# Patient Record
Sex: MALE | Race: ASIAN | NOT HISPANIC OR LATINO | Employment: UNEMPLOYED | URBAN - METROPOLITAN AREA
[De-identification: names, ages, dates, MRNs, and addresses within clinical notes are randomized per-mention and may not be internally consistent; named-entity substitution may affect disease eponyms.]

---
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
## 2025-01-22 ENCOUNTER — OFFICE VISIT (OUTPATIENT)
Dept: FAMILY MEDICINE | Facility: CLINIC | Age: 20
End: 2025-01-22
Payer: COMMERCIAL

## 2025-01-22 VITALS
TEMPERATURE: 98.5 F | HEIGHT: 66 IN | BODY MASS INDEX: 26.13 KG/M2 | SYSTOLIC BLOOD PRESSURE: 114 MMHG | DIASTOLIC BLOOD PRESSURE: 77 MMHG | HEART RATE: 78 BPM | OXYGEN SATURATION: 99 % | WEIGHT: 162.6 LBS | RESPIRATION RATE: 17 BRPM

## 2025-01-22 DIAGNOSIS — Z11.59 NEED FOR HEPATITIS C SCREENING TEST: ICD-10-CM

## 2025-01-22 DIAGNOSIS — Z00.129 ENCOUNTER FOR ROUTINE CHILD HEALTH EXAMINATION W/O ABNORMAL FINDINGS: Primary | ICD-10-CM

## 2025-01-22 DIAGNOSIS — Z11.4 SCREENING FOR HIV (HUMAN IMMUNODEFICIENCY VIRUS): ICD-10-CM

## 2025-01-22 DIAGNOSIS — Z13.220 LIPID SCREENING: ICD-10-CM

## 2025-01-22 DIAGNOSIS — Z13.1 ENCOUNTER FOR SCREENING EXAMINATION FOR IMPAIRED GLUCOSE REGULATION AND DIABETES MELLITUS: ICD-10-CM

## 2025-01-22 DIAGNOSIS — Z23 ENCOUNTER FOR IMMUNIZATION: ICD-10-CM

## 2025-01-22 LAB
CHOLEST SERPL-MCNC: 218 MG/DL
FASTING STATUS PATIENT QL REPORTED: NO
FASTING STATUS PATIENT QL REPORTED: NO
GLUCOSE SERPL-MCNC: 99 MG/DL (ref 70–99)
HCV AB SERPL QL IA: NONREACTIVE
HDLC SERPL-MCNC: 39 MG/DL
HIV 1+2 AB+HIV1 P24 AG SERPL QL IA: NONREACTIVE
LDLC SERPL CALC-MCNC: 143 MG/DL
NONHDLC SERPL-MCNC: 179 MG/DL
TRIGL SERPL-MCNC: 181 MG/DL

## 2025-01-22 PROCEDURE — 36415 COLL VENOUS BLD VENIPUNCTURE: CPT | Performed by: PREVENTIVE MEDICINE

## 2025-01-22 PROCEDURE — 90480 ADMN SARSCOV2 VAC 1/ONLY CMP: CPT | Mod: SL | Performed by: PREVENTIVE MEDICINE

## 2025-01-22 PROCEDURE — 92551 PURE TONE HEARING TEST AIR: CPT | Performed by: PREVENTIVE MEDICINE

## 2025-01-22 PROCEDURE — 90471 IMMUNIZATION ADMIN: CPT | Mod: SL | Performed by: PREVENTIVE MEDICINE

## 2025-01-22 PROCEDURE — 82947 ASSAY GLUCOSE BLOOD QUANT: CPT | Performed by: PREVENTIVE MEDICINE

## 2025-01-22 PROCEDURE — 87389 HIV-1 AG W/HIV-1&-2 AB AG IA: CPT | Performed by: PREVENTIVE MEDICINE

## 2025-01-22 PROCEDURE — 86803 HEPATITIS C AB TEST: CPT | Performed by: PREVENTIVE MEDICINE

## 2025-01-22 PROCEDURE — 96127 BRIEF EMOTIONAL/BEHAV ASSMT: CPT | Performed by: PREVENTIVE MEDICINE

## 2025-01-22 PROCEDURE — 99173 VISUAL ACUITY SCREEN: CPT | Mod: 59 | Performed by: PREVENTIVE MEDICINE

## 2025-01-22 PROCEDURE — 91320 SARSCV2 VAC 30MCG TRS-SUC IM: CPT | Mod: SL | Performed by: PREVENTIVE MEDICINE

## 2025-01-22 PROCEDURE — 99395 PREV VISIT EST AGE 18-39: CPT | Mod: 25 | Performed by: PREVENTIVE MEDICINE

## 2025-01-22 PROCEDURE — S0302 COMPLETED EPSDT: HCPCS | Performed by: PREVENTIVE MEDICINE

## 2025-01-22 PROCEDURE — 80061 LIPID PANEL: CPT | Performed by: PREVENTIVE MEDICINE

## 2025-01-22 PROCEDURE — 90656 IIV3 VACC NO PRSV 0.5 ML IM: CPT | Mod: SL | Performed by: PREVENTIVE MEDICINE

## 2025-01-22 SDOH — HEALTH STABILITY: PHYSICAL HEALTH: ON AVERAGE, HOW MANY MINUTES DO YOU ENGAGE IN EXERCISE AT THIS LEVEL?: PATIENT DECLINED

## 2025-01-22 SDOH — HEALTH STABILITY: PHYSICAL HEALTH
ON AVERAGE, HOW MANY DAYS PER WEEK DO YOU ENGAGE IN MODERATE TO STRENUOUS EXERCISE (LIKE A BRISK WALK)?: PATIENT DECLINED

## 2025-01-22 ASSESSMENT — PAIN SCALES - GENERAL: PAINLEVEL_OUTOF10: NO PAIN (0)

## 2025-01-22 NOTE — LETTER
January 23, 2025      Prateek Crystal  [address]  [place] [address]        Dear ,    We are writing to inform you of your test results.    Your LDL is: Lab Results        Component                Value               Date                       LDL                      143                 01/22/2025          Your LDL goal is to be less than 160   Your HDL is: Lab Results        Component                Value               Date                       HDL                      39                  01/22/2025           Goal HDL is Greater than 40 (for men) or 50 (for women).   Your Triglycerides are: Lab Results        Component                Value               Date                       TRIG                     181                 01/22/2025          Goal TRIGLYCERIDES are less than 150.       Here are some ways to improve your cholesterol without medication:     Try to get at least 45 minutes of aerobic exercise 5-6 days a week   Maintain a healthy body weight   Eat less saturated fats   Buy lean cuts of meat, reduce your portions of red meat or substitute poultry or fish   Avoid fried or fast foods that are high in fat   Eat more fruits and vegetables     Screening test for HIV and hepatitis C are negative.  Glucose is normal, you do not have diabetes.    Resulted Orders   Lipid panel reflex to direct LDL Non-fasting   Result Value Ref Range    Cholesterol 218 (H) <200 mg/dL    Triglycerides 181 (H) <150 mg/dL    Direct Measure HDL 39 (L) >=40 mg/dL    LDL Cholesterol Calculated 143 (H) <100 mg/dL    Non HDL Cholesterol 179 (H) <130 mg/dL    Patient Fasting > 8hrs? No     Narrative    Cholesterol  Desirable: < 200 mg/dL  Borderline High: 200 - 239 mg/dL  High: >= 240 mg/dL    Triglycerides  Normal: < 150 mg/dL  Borderline High: 150 - 199 mg/dL  High: 200-499 mg/dL  Very High: >= 500 mg/dL    Direct Measure HDL  Female: >= 50 mg/dL   Male: >= 40 mg/dL    LDL Cholesterol  Desirable: < 100 mg/dL  Above  Desirable: 100 - 129 mg/dL   Borderline High: 130 - 159 mg/dL   High:  160 - 189 mg/dL   Very High: >= 190 mg/dL    Non HDL Cholesterol  Desirable: < 130 mg/dL  Above Desirable: 130 - 159 mg/dL  Borderline High: 160 - 189 mg/dL  High: 190 - 219 mg/dL  Very High: >= 220 mg/dL   HIV Antigen Antibody Combo   Result Value Ref Range    HIV Antigen Antibody Combo Nonreactive Nonreactive      Comment:      Negative HIV-1 p24 antigen and HIV-1/2 antibody screening test results usually indicate the absence of HIV-1 and HIV-2 infection. However, such negative results do not rule-out acute HIV infection.  If acute HIV-1 or HIV-2 infection is suspected, detection of HIV-1 or HIV-2 RNA  is recommended. This result is obtained using the Roche Elecsys HIV Duo method on the noreen e801 immunoassay analyzer.   Hepatitis C Screen Reflex to HCV RNA Quant and Genotype   Result Value Ref Range    Hepatitis C Antibody Nonreactive Nonreactive      Comment:      A nonreactive screening test result does not exclude the possibility of exposure to or infection with HCV. Nonreactive screening test results in individuals with prior exposure to HCV may be due to antibody levels below the limit of detection of this assay or lack of reactivity to the HCV antigens used in this assay. Patients with recent HCV infections (<3 months from time of exposure) may have false-negative HCV antibody results due to the time needed for seroconversion (average of 8 to 9 weeks).   Glucose   Result Value Ref Range    Glucose 99 70 - 99 mg/dL    Patient Fasting > 8hrs? No        If you have any questions or concerns, please call the clinic at the number listed above.       Sincerely,      Karlee Estrella MD    Electronically signed

## 2025-01-22 NOTE — PROGRESS NOTES
Preventive Care Visit  [facility] [place] Estrella MD, Family Medicine  Jan 22, 2025    Assessment & Plan   20 year old, here for preventive care.    Encounter for routine child health examination w/o abnormal findings  -will follow up with Dr. Pizano for ears, last seen 2022   - BEHAVIORAL/EMOTIONAL ASSESSMENT (21855)  - SCREENING TEST, PURE TONE, AIR ONLY  - SCREENING, VISUAL ACUITY, QUANTITATIVE, BILAT  - Glucose    Lipid screening  - Lipid panel reflex to direct LDL Non-fasting    Screening for HIV (human immunodeficiency virus)  - HIV Antigen Antibody Combo    Need for hepatitis C screening test  - Hepatitis C Screen Reflex to HCV RNA Quant and Genotype    Encounter for immunization  -Flu and Covid vaccines done today    Encounter for screening examination for impaired glucose regulation and diabetes mellitus  - Glucose      Growth      Normal height and weight    Immunizations   Appropriate vaccinations were ordered.  MenB Vaccine not indicated.      Anticipatory Guidance    Reviewed age appropriate anticipatory guidance.     Healthy food choices    Adequate sleep/ exercise    Sleep issues    Contraception     Cleared for sports:  Not addressed    Referrals/Ongoing Specialty Care  Ongoing care with ENT  Verbal Dental Referral: Patient has established dental home        Subjective   Prateek is presenting for the following:  Well Child          1/22/2025     8:45 AM   Additional Questions   Accompanied by mom/brother- Demetrio           1/22/2025   Social   Lives with Family   Recent potential stressors None   History of trauma No   Family Hx of mental health challenges No   Lack of transportation has limited access to appts/meds No   Do you have housing? (Housing is defined as stable permanent housing and does not include staying ouside in a car, in a tent, in an abandoned building, in an overnight shelter, or couch-surfing.) Yes   Are you worried about losing your housing? No          1/22/2025     8:42 AM   Health Risks/Safety   Do you always wear a seat belt? Yes   Helmet use? Yes         4/22/2022    12:34 PM   TB Screening   Was your adolescent born outside of the [place]? No        Proxy-reported         1/22/2025     8:42 AM   TB Screening: Consider immunosuppression as a risk factor for TB   Recent TB infection or positive TB test in family/close contacts No   Recent travel outside [place] (you/family/close contacts) No   Recent residence in high-risk group setting (correctional facility/health care facility/homeless shelter/refugee camp) No          1/22/2025     8:42 AM   Dyslipidemia   FH: premature cardiovascular disease No, these conditions are not present in the patient's biologic parents or grandparents   FH: hyperlipidemia No   Personal risk factors for heart disease NO diabetes, high blood pressure, obesity, smokes cigarettes, kidney problems, heart or kidney transplant, history of Kawasaki disease with an aneurysm, lupus, rheumatoid arthritis, or HIV     Recent Labs   Lab Test 05/19/22  1214   CHOL 197*   HDL 53   *   TRIG 58           1/22/2025     8:42 AM   Diet   What type of water? (!) BOTTLED    (!) FILTERED         1/22/2025   Diet   Do you have questions about your eating?  No   Do you have questions about your weight?  No   What do you regularly drink? Water   What type of water? (!) BOTTLED    (!) FILTERED   Do you think you eat healthy foods? Yes   At least 3 servings of food or beverages that have calcium each day? Yes   How would you describe your diet?  No restrictions   In past 12 months, concerned food might run out No   In past 12 months, food has run out/couldn't afford more No       Multiple values from one day are sorted in reverse-chronological order         1/22/2025   Activity   Days per week of moderate/strenuous exercise Patient declined   On average, how many minutes do you engage in exercise at this level? Patient declined   What do you do for  "exercise? run   What activities are you involved with? no         1/22/2025     8:42 AM   Media Use   Hours per day of screen time (for entertainment) 1 hour         1/22/2025     8:42 AM   Sleep   Do you have any trouble with sleep? No         1/22/2025     8:42 AM   School   Are you in school? 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  Facility age limit for growth %anthony is 20 years.  Facility age limit for growth %anthony is 20 years.  Growth %ile SmartLinks can only be used for patients less than 20 years old.    Vision Screen  Vision Screen Details  Does the patient have corrective lenses (glasses/contacts)?: No  Vision Acuity Screen  Vision Acuity Tool: James  RIGHT EYE: 10/8 (20/16)  LEFT EYE: 10/12.5 (20/25)  Is there a two line difference?: (!) YES  Vision Screen Results: (!) REFER    Hearing Screen  RIGHT EAR  1000 Hz on Level 40 dB (Conditioning sound): Pass  1000 Hz on Level 20 dB: Pass  2000 Hz on Level 20 dB: Pass  4000 Hz on Level 20 dB: Pass  6000 Hz on Level 20 dB: Pass  8000 Hz on Level 20 dB: Pass  LEFT EAR  8000 Hz on Level 20 dB: Pass  6000 Hz on Level 20 dB: Pass  4000 Hz on Level 20 dB: Pass  2000 Hz on Level 20 dB: Pass  1000 Hz on Level 20 dB: Pass  500 Hz on Level 25 dB: Pass  RIGHT EAR  500 Hz on Level 25 dB: Pass  Results  Hearing Screen Results: Pass      Physical Exam  GENERAL: Active, alert, in no acute distress.  SKIN: Clear. No significant rash, abnormal pigmentation or lesions  HEAD: Normocephalic  EYES: Pupils equal, round, reactive, " Extraocular muscles intact. Normal conjunctivae.  EARS: Normal canals. Tympanic membrane perforation on right side. Left side is intact but retracted   NECK: Supple, no masses.  No thyromegaly.  LYMPH NODES: No adenopathy  LUNGS: Clear. No rales, rhonchi, wheezing or retractions  HEART: Regular rhythm. Normal S1/S2. No murmurs. Normal pulses.  ABDOMEN: Soft, non-tender, not distended, no masses or hepatosplenomegaly. Bowel sounds normal.   NEUROLOGIC: No focal findings. Cranial nerves grossly intact: DTR's normal. Normal gait, strength and tone  BACK: Spine is straight, no scoliosis.  EXTREMITIES: Full range of motion, no deformities  : Normal male external genitalia. Javid stage 5,  both testes descended, no hernia.        Signed Electronically by: Karlee Estrella MD

## 2025-01-22 NOTE — PATIENT INSTRUCTIONS
Patient Education    [facility] HANDOUT- PATIENT  18 THROUGH 21 YEAR VISITS  Here are some suggestions from [employer] experts that may be of value to your family.     HOW YOU ARE DOING  Enjoy spending time with your family.  Find activities you are really interested in, such as sports, theater, or volunteering.  Try to be responsible for your schoolwork or work obligations.  Always talk through problems and never use violence.  If you get angry with someone, try to walk away.  If you feel unsafe in your home or have been hurt by someone, let us know. Hotlines and community agencies can also provide confidential help.  Talk with us if you are worried about your living or food situation. Community agencies and programs such as SNAP can help.  Don t smoke, vape, or use drugs. Avoid people who do when you can. Talk with us if you are worried about alcohol or drug use in your family.    YOUR DAILY LIFE  Visit the dentist at least twice a year.  Brush your teeth at least twice a day and floss once a day.  Be a healthy eater.  Have vegetables, fruits, lean protein, and whole grains at meals and snacks.  Limit fatty, sugary, salty foods that are low in nutrients, such as candy, chips, and ice cream.  Eat when you re hungry. Stop when you feel satisfied.  Eat breakfast.  Drink plenty of water.  Make sure to get enough calcium every day.  Have 3 or more servings of low-fat (1%) or fat-free milk and other low-fat dairy products, such as yogurt and cheese.  Women: Make sure to eat foods rich in folate, such as fortified grains and dark- green leafy vegetables.  Aim for at least 1 hour of physical activity every day.  Wear safety equipment when you play sports.  Get enough sleep.  Talk with us about managing your health care and insurance as an adult.    YOUR FEELINGS  Most people have ups and downs. If you are feeling sad, depressed, nervous, irritable, hopeless, or angry, let us know or reach out to another health  care professional.  Figure out healthy ways to deal with stress.  Try your best to solve problems and make decisions on your own.  Sexuality is an important part of your life. If you have any questions or concerns, we are here for you.    HEALTHY BEHAVIOR CHOICES  Avoid using drugs, alcohol, tobacco, steroids, and diet pills. Support friends who choose not to use.  If you use drugs or alcohol, let us know or talk with another trusted adult about it. We can help you with quitting or cutting down on your use.  Make healthy decisions about your sexual behavior.  If you are sexually active, always practice safe sex. Always use birth control along with a condom to prevent pregnancy and sexually transmitted infections.  All sexual activity should be something you want. No one should ever force or try to convince you.  Protect your hearing at work, home, and concerts. Keep your earbud volume down.    STAYING SAFE  Always be a safe and cautious .  Insist that everyone use a lap and shoulder seat belt.  Limit the number of friends in the car and avoid driving at night.  Avoid distractions. Never text or talk on the phone while you drive.  Do not ride in a vehicle with someone who has been using drugs or alcohol.  If you feel unsafe driving or riding with someone, call someone you trust to drive you.  Wear helmets and protective gear while playing sports. Wear a helmet when riding a bike, a motorcycle, or an ATV or when skiing or skateboarding.  Always use sunscreen and a hat when you re outside.  Fighting and carrying weapons can be dangerous. Talk with your parents, teachers, or doctor about how to avoid these situations.        Consistent with Bright Futures: Guidelines for Health Supervision of Infants, Children, and Adolescents, 4th Edition  For more information, go to https://brightfutures.aap.org.

## 2025-01-23 NOTE — RESULT ENCOUNTER NOTE
Please send a letter:    Dear Prateek Crystal    Here are your cholesterol results:    Your LDL is: Lab Results       Component                Value               Date                       LDL                      143                 01/22/2025          Your LDL goal is to be less than 160  Your HDL is: Lab Results       Component                Value               Date                       HDL                      39                  01/22/2025           Goal HDL is Greater than 40 (for men) or 50 (for women).  Your Triglycerides are: Lab Results       Component                Value               Date                       TRIG                     181                 01/22/2025          Goal TRIGLYCERIDES are less than 150.       Here are some ways to improve your cholesterol without medication:    Try to get at least 45 minutes of aerobic exercise 5-6 days a week  Maintain a healthy body weight  Eat less saturated fats  Buy lean cuts of meat, reduce your portions of red meat or substitute poultry or fish  Avoid fried or fast foods that are high in fat  Eat more fruits and vegetables    Screening test for HIV and hepatitis C are negative.  Glucose is normal, you do not have diabetes.    Please let me know if you have any questions and thank you for choosing [facility].      Regards,    Karlee Estrella MD MPH

## (undated) DEVICE — TUBE EAR PAPARELLA TYPE 1 ULTRASIL 1.14MM 70240280

## (undated) DEVICE — TUBE EAR GOODE T SIL 10-16010

## (undated) DEVICE — TUBING SUCTION 10'X3/16" N510

## (undated) DEVICE — GLOVE PROTEXIS W/NEU-THERA 7.5  2D73TE75

## (undated) DEVICE — SOL WATER IRRIG 1000ML BOTTLE 07139-09

## (undated) DEVICE — BLADE KNIFE BEAVER 7" 71N

## (undated) RX ORDER — ACETAMINOPHEN 325 MG/1
TABLET ORAL
Status: DISPENSED
Start: 2018-11-13

## (undated) RX ORDER — PROPOFOL 10 MG/ML
INJECTION, EMULSION INTRAVENOUS
Status: DISPENSED
Start: 2018-11-13

## (undated) RX ORDER — FENTANYL CITRATE 50 UG/ML
INJECTION, SOLUTION INTRAMUSCULAR; INTRAVENOUS
Status: DISPENSED
Start: 2018-03-29

## (undated) RX ORDER — LIDOCAINE HYDROCHLORIDE 20 MG/ML
INJECTION, SOLUTION EPIDURAL; INFILTRATION; INTRACAUDAL; PERINEURAL
Status: DISPENSED
Start: 2018-11-13

## (undated) RX ORDER — FENTANYL CITRATE 50 UG/ML
INJECTION, SOLUTION INTRAMUSCULAR; INTRAVENOUS
Status: DISPENSED
Start: 2018-11-13

## (undated) RX ORDER — GLYCOPYRROLATE 0.2 MG/ML
INJECTION INTRAMUSCULAR; INTRAVENOUS
Status: DISPENSED
Start: 2018-11-13